# Patient Record
(demographics unavailable — no encounter records)

---

## 2025-06-15 NOTE — ASSESSMENT
[FreeTextEntry1] : 46-year-old female with good general medical health.   Chronically follows with psychiatry for anxiety/depression and ADD currently appears under control.  No medical issues.  Blood work with iron deficiency anemia highly likely secondary to heavy menstrual cycles. blood work will include CBC and iron studies. Status post rectal bleeding March 2021 they have all again told the patient to followup with gastroenterology.  Most recent blood work October 2023 with no anemia and normal iron studies  GYN followup as scheduled. Mammography March 2022 status post right biopsy with patient told to followup with diagnostic mammography in 6 months  COVID vaccine already received  Influenza vaccine given left deltoid Tdap is due but continues to decline  Venipuncture done today in the office  Followup in one year and as needed. [Vaccines Reviewed] : Immunizations reviewed today. Please see immunization details in the vaccine log within the immunization flowsheet.

## 2025-06-15 NOTE — PHYSICAL EXAM
[General Appearance - Alert] : alert [General Appearance - In No Acute Distress] : in no acute distress [Sclera] : the sclera and conjunctiva were normal [Extraocular Movements] : extraocular movements were intact [PERRL With Normal Accommodation] : pupils were equal in size, round, and reactive to light [Outer Ear] : the ears and nose were normal in appearance [Oropharynx] : the oropharynx was normal [Neck Appearance] : the appearance of the neck was normal [Neck Cervical Mass (___cm)] : no neck mass was observed [Jugular Venous Distention Increased] : there was no jugular-venous distention [Thyroid Nodule] : there were no palpable thyroid nodules [Thyroid Diffuse Enlargement] : the thyroid was not enlarged [Auscultation Breath Sounds / Voice Sounds] : lungs were clear to auscultation bilaterally [Heart Rate And Rhythm] : heart rate was normal and rhythm regular [Heart Sounds] : normal S1 and S2 [Heart Sounds Gallop] : no gallops [Murmurs] : no murmurs [Heart Sounds Pericardial Friction Rub] : no pericardial rub [Full Pulse] : the pedal pulses are present [Edema] : there was no peripheral edema [Bowel Sounds] : normal bowel sounds [Abdomen Soft] : soft [Abdomen Tenderness] : non-tender [Abdomen Mass (___ Cm)] : no abdominal mass palpated [Cervical Lymph Nodes Enlarged Posterior Bilaterally] : posterior cervical [Cervical Lymph Nodes Enlarged Anterior Bilaterally] : anterior cervical [Supraclavicular Lymph Nodes Enlarged Bilaterally] : supraclavicular [Axillary Lymph Nodes Enlarged Bilaterally] : axillary [Femoral Lymph Nodes Enlarged Bilaterally] : femoral [Inguinal Lymph Nodes Enlarged Bilaterally] : inguinal [Abnormal Walk] : normal gait [Nail Clubbing] : no clubbing  or cyanosis of the fingernails [Musculoskeletal - Swelling] : no joint swelling seen [Motor Tone] : muscle strength and tone were normal [Skin Color & Pigmentation] : normal skin color and pigmentation [Skin Turgor] : normal skin turgor [] : no rash [Deep Tendon Reflexes (DTR)] : deep tendon reflexes were 2+ and symmetric [Sensation] : the sensory exam was normal to light touch and pinprick [No Focal Deficits] : no focal deficits [Oriented To Time, Place, And Person] : oriented to person, place, and time [Impaired Insight] : insight and judgment were intact [Affect] : the affect was normal [FreeTextEntry1] : sees psychiatry

## 2025-06-15 NOTE — HEALTH RISK ASSESSMENT
[Very Good] : ~his/her~ current health as very good [Excellent] : ~his/her~  mood as  excellent [No] : In the past 12 months have you used drugs other than those required for medical reasons? No [No falls in past year] : Patient reported no falls in the past year [0] : 2) Feeling down, depressed, or hopeless: Not at all (0) [PHQ-2 Negative - No further assessment needed] : PHQ-2 Negative - No further assessment needed [Audit-CScore] : 0 [de-identified] : exercises daily [de-identified] : good [DYL1Vymiz] : 0 [Former] : Former [5-9] : 5-9 [de-identified] : 2019 [Patient reported PAP Smear was normal] : Patient reported PAP Smear was normal [Change in mental status noted] : No change in mental status noted [None] : None [With Significant Other] : lives with significant other [# of Members in Household ___] :  household currently consist of [unfilled] member(s) [Employed] : employed [Graduate School] : graduate school [] :  [# Of Children ___] : has [unfilled] children [Sexually Active] : sexually active [Feels Safe at Home] : Feels safe at home [Fully functional (bathing, dressing, toileting, transferring, walking, feeding)] : Fully functional (bathing, dressing, toileting, transferring, walking, feeding) [Fully functional (using the telephone, shopping, preparing meals, housekeeping, doing laundry, using] : Fully functional and needs no help or supervision to perform IADLs (using the telephone, shopping, preparing meals, housekeeping, doing laundry, using transportation, managing medications and managing finances) [Reports changes in hearing] : Reports no changes in hearing [Reports changes in dental health] : Reports no changes in dental health [Reports changes in vision] : Reports no changes in vision [Smoke Detector] : smoke detector [Carbon Monoxide Detector] : carbon monoxide detector [Seat Belt] :  uses seat belt [Sunscreen] : uses sunscreen [PapSmearDate] : 03/23 [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

## 2025-06-15 NOTE — REVIEW OF SYSTEMS
[Dysuria] : dysuria [Incontinence] : no incontinence [Pelvic Pain] : no pelvic pain [Dysmenorrhea] : no dysmenorrhea [see HPI] : see HPI [Negative] : Heme/Lymph

## 2025-06-15 NOTE — COUNSELING
[None] : None [Good understanding] : Patient has a good understanding of lifestyle changes and the steps needed to achieve self management goals [de-identified] : Continue diet and exercise

## 2025-06-15 NOTE — HISTORY OF PRESENT ILLNESS
[de-identified] : 46-year-old female presents for her yearly physical.  The patient's medical history is essentially negative with no significant past medical history including no cardiopulmonary, hepatorenal, hematological, or diabetes.  The patient's main history is psychiatric with having a history of anxiety/depression and ADD for which she follows with psychiatry. She continues on her medications, which she states are helping her.  she patient had iron deficiency anemia with heavy menstrual cycle.It was recommended the patient do Hemoccult which she has not done nor is she taking multivitamin with iron. Still with heavy cycles. She had some rectal bleeding March 2021 therefore saw gastroenterology who is planned for colonoscopy but the patient did not followup. Most recent blood work October 2023 showing no anemia and normal iron studies.  Overall, she is feeling well without complaints, including no chest pain, palpitations, shortness of breath, or edema.  She is status post right breast biopsy March 2022 with pathology = benign.  The patient is now happily  and has 3 children who are doing well. She bought a new home and moved in in 2023 She works as a psychologist. She is also a .